# Patient Record
Sex: FEMALE | Race: WHITE | Employment: UNEMPLOYED | URBAN - METROPOLITAN AREA
[De-identification: names, ages, dates, MRNs, and addresses within clinical notes are randomized per-mention and may not be internally consistent; named-entity substitution may affect disease eponyms.]

---

## 2021-11-14 ENCOUNTER — HOSPITAL ENCOUNTER (EMERGENCY)
Facility: HOSPITAL | Age: 41
Discharge: HOME | End: 2021-11-14
Attending: EMERGENCY MEDICINE
Payer: MEDICAID

## 2021-11-14 VITALS
RESPIRATION RATE: 22 BRPM | BODY MASS INDEX: 43.79 KG/M2 | SYSTOLIC BLOOD PRESSURE: 121 MMHG | TEMPERATURE: 98 F | DIASTOLIC BLOOD PRESSURE: 83 MMHG | HEIGHT: 62 IN | HEART RATE: 88 BPM | WEIGHT: 238 LBS | OXYGEN SATURATION: 98 %

## 2021-11-14 DIAGNOSIS — H60.501 ACUTE OTITIS EXTERNA OF RIGHT EAR, UNSPECIFIED TYPE: Primary | ICD-10-CM

## 2021-11-14 PROCEDURE — 99283 EMERGENCY DEPT VISIT LOW MDM: CPT

## 2021-11-14 RX ORDER — NEOMYCIN SULFATE, POLYMYXIN B SULFATE AND BACITRACIN ZINC 3.5; 10000; 4 MG/G; [USP'U]/G; [USP'U]/G
OINTMENT OPHTHALMIC 4 TIMES DAILY
COMMUNITY

## 2021-11-14 RX ORDER — CIPROFLOXACIN AND DEXAMETHASONE 3; 1 MG/ML; MG/ML
4 SUSPENSION/ DROPS AURICULAR (OTIC) 2 TIMES DAILY
Qty: 7.5 ML | Refills: 0 | Status: SHIPPED | OUTPATIENT
Start: 2021-11-14 | End: 2021-11-21

## 2021-11-14 RX ORDER — AMOXICILLIN 875 MG/1
875 TABLET, COATED ORAL 2 TIMES DAILY
COMMUNITY

## 2021-11-14 RX ORDER — TRAMADOL HYDROCHLORIDE 50 MG/1
50 TABLET ORAL EVERY 6 HOURS PRN
Qty: 10 TABLET | Refills: 0 | Status: SHIPPED | OUTPATIENT
Start: 2021-11-14 | End: 2021-11-21

## 2021-11-14 RX ORDER — SERTRALINE HYDROCHLORIDE 100 MG/1
150 TABLET, FILM COATED ORAL DAILY
COMMUNITY

## 2021-11-14 RX ORDER — CETIRIZINE HYDROCHLORIDE 10 MG/1
10 TABLET ORAL DAILY
COMMUNITY

## 2021-11-14 RX ORDER — VALSARTAN 80 MG/1
80 TABLET ORAL DAILY
COMMUNITY

## 2021-11-14 NOTE — ED PROVIDER NOTES
Patient Seen in: [facility] AND [facility] Emergency Department      History   Patient presents with:  Ear Problem Pain    Stated Complaint: ear pain    Subjective:   HPI    68-year-old female with recent diagnosis of otitis media, currently on amoxicillin and n 11/14/21 0629 88   Resp 11/14/21 0629 22   Temp 11/14/21 0629 98.2 °F (36.8 °C)   Temp src --    SpO2 11/14/21 0659 98 %   O2 Device 11/14/21 0659 None (Room air)       Current:/83   Pulse 88   Temp 98.2 °F (36.8 °C)   Resp 22   Ht 157.5 cm (5' 2\") Factors: The patient already has does not have a problem list on file. to contribute to the complexity of his ED evaluation.     - pt  comfortable with d/c at this time, will d/c pt home now with Rx for ciprodex, pt to f/u with Asher Sarah in 2 days or r

## 2021-11-14 NOTE — ED INITIAL ASSESSMENT (HPI)
States that she has Dx with an ea infection earlier this week and placed on antibiotics, Now states that she has increase pain and drainage from the Rt ear.

## 2022-12-29 ENCOUNTER — HOSPITAL ENCOUNTER (EMERGENCY)
Facility: HOSPITAL | Age: 42
Discharge: HOME | End: 2022-12-30
Attending: EMERGENCY MEDICINE
Payer: MEDICAID

## 2022-12-29 DIAGNOSIS — S52.121A CLOSED DISPLACED FRACTURE OF HEAD OF RIGHT RADIUS, INITIAL ENCOUNTER: Primary | ICD-10-CM

## 2022-12-29 DIAGNOSIS — S53.104A DISLOCATION OF RIGHT ELBOW, INITIAL ENCOUNTER: ICD-10-CM

## 2022-12-29 PROCEDURE — 99285 EMERGENCY DEPT VISIT HI MDM: CPT

## 2022-12-29 PROCEDURE — 24600 TX CLSD ELBOW DISLC W/O ANES: CPT

## 2022-12-30 ENCOUNTER — APPOINTMENT (OUTPATIENT)
Dept: CT IMAGING | Facility: HOSPITAL | Age: 42
End: 2022-12-30
Attending: EMERGENCY MEDICINE
Payer: MEDICAID

## 2022-12-30 ENCOUNTER — APPOINTMENT (OUTPATIENT)
Dept: GENERAL RADIOLOGY | Facility: HOSPITAL | Age: 42
End: 2022-12-30
Attending: EMERGENCY MEDICINE
Payer: MEDICAID

## 2022-12-30 VITALS
OXYGEN SATURATION: 98 % | RESPIRATION RATE: 19 BRPM | BODY MASS INDEX: 44 KG/M2 | SYSTOLIC BLOOD PRESSURE: 114 MMHG | WEIGHT: 237.88 LBS | HEART RATE: 79 BPM | DIASTOLIC BLOOD PRESSURE: 67 MMHG

## 2022-12-30 PROCEDURE — 96374 THER/PROPH/DIAG INJ IV PUSH: CPT

## 2022-12-30 PROCEDURE — 73200 CT UPPER EXTREMITY W/O DYE: CPT | Performed by: EMERGENCY MEDICINE

## 2022-12-30 PROCEDURE — 73080 X-RAY EXAM OF ELBOW: CPT | Performed by: EMERGENCY MEDICINE

## 2022-12-30 PROCEDURE — 73070 X-RAY EXAM OF ELBOW: CPT | Performed by: EMERGENCY MEDICINE

## 2022-12-30 PROCEDURE — 96375 TX/PRO/DX INJ NEW DRUG ADDON: CPT

## 2022-12-30 PROCEDURE — 96376 TX/PRO/DX INJ SAME DRUG ADON: CPT

## 2022-12-30 RX ORDER — HYDROCODONE BITARTRATE AND ACETAMINOPHEN 5; 325 MG/1; MG/1
1 TABLET ORAL EVERY 6 HOURS PRN
Qty: 10 TABLET | Refills: 0 | Status: SHIPPED | OUTPATIENT
Start: 2022-12-30 | End: 2023-01-06

## 2022-12-30 RX ORDER — KETAMINE HYDROCHLORIDE 50 MG/ML
0.5 INJECTION, SOLUTION, CONCENTRATE INTRAMUSCULAR; INTRAVENOUS ONCE
Status: COMPLETED | OUTPATIENT
Start: 2022-12-30 | End: 2022-12-30

## 2022-12-30 RX ORDER — MORPHINE SULFATE 4 MG/ML
4 INJECTION, SOLUTION INTRAMUSCULAR; INTRAVENOUS ONCE
Status: COMPLETED | OUTPATIENT
Start: 2022-12-30 | End: 2022-12-30

## 2022-12-30 NOTE — CONSULTS
[facility]    PATIENT'S NAME: Zonia Shaw   ATTENDING PHYSICIAN: Darrow Phoenix, MD   CONSULTING PHYSICIAN: Ted Butts MD   PATIENT ACCOUNT#:   [id number]    LOCATION:  [address]  MEDICAL RECORD #:   [id number]       YOB: 1980  ADMISSION DATE:       12/29/2022      CONSULT DATE:  12/30/2022    REPORT OF CONSULTATION    REASON FOR CONSULTATION:  Irreducible right elbow dislocation. HISTORY OF PRESENT ILLNESS:  The patient is a 28-year-old female who fell earlier this evening at home. She landed on her right outstretched upper extremity and felt immediate pain in the elbow. She was brought to the emergency room and diagnosed with a dislocation of her right elbow. A reduction was attempted in the emergency room by applying axial traction and hyperflexion of the elbow with no improvement in alignment. She reports some numbness in the small finger and ring finger. No prior problems with the elbow. PAST MEDICAL HISTORY:  Unknown. MEDICATIONS:  Valsartan, sertraline, cetirizine. ALLERGIES:  No known drug allergies. SOCIAL HISTORY:  The patient lives with her significant other. She is a nonsmoker. REVIEW OF SYSTEMS:  Unremarkable. PHYSICAL EXAMINATION:    GENERAL:  A mildly obese female in mild distress. She is complaining of pain in the right elbow. EXTREMITIES:  Examination of the right elbow reveals a deformity with palpable radius and ulna lateral to the humerus. There is diminished light touch sensation in the ulnar nerve distribution. She has a difficult time abducting and adducting the fingers. Thumb extension, opposition intact. Light touch sensation intact throughout the thumb and index finger. Fingers are well perfused. LABORATORY DATA:  X-rays of the right elbow were reviewed. There is what appears to be a lateral dislocation of the elbow. There may be a fracture of the radial head.   Coronoid process is difficult to visualize on the lateral view. ASSESSMENT AND PLAN:  The patient has a lateral dislocation of the elbow, which is irreducible at first attempt. I recommended attempted closed reduction here in the emergency room under sedation. The patient was sedated with ketamine and propofol and the elbow was fully extended with the axial traction and a manual force placed on the radial head and olecranon process forcing the forearm bones from lateral to medial.  The elbow reduced. There was a palpable clunk as the elbow reduced. The elbow was then brought into flexion and a long-arm splint was applied with the elbow flexed to 90 degrees. A postreduction CT scan was ordered. She may follow up as an outpatient in 1 week. I expect gradual improvement in her neurologic deficit. Dictated By Lupe Hicks.  Valente Ying MD  d: 12/30/2022 72:31:23  t: 12/30/2022 39:44:40  Isis Melara [id number]  P/

## 2022-12-30 NOTE — ED QUICK NOTES
Patient provided with discharge instructions. Verbalized understanding for plan of care at home and follow up. All question and concerns addressed prior to discharge. IV REMOVED, CATHETER INTACT. GAVE PT RX TO TAKE TO PHARMACY.

## 2022-12-30 NOTE — DISCHARGE INSTRUCTIONS
Take Tylenol as needed for pain. If your pain is not relieved with Tylenol  you can take Norco as needed for severe pain. Each tablet of Norco has 325 milligrams acetaminophen (Tylenol). Do not take more than 3900 mg acetaminophen (Tylenol) in 1 day. Do not drink alcohol or drive while taking Norco.  Take an over-the-counter stool softener such as Colace while taking Norco.  Risk of addiction to pain medication increases after 3 days, make sure to use this for shortest duration as possible and only for severe pain. You can also take ibuprofen as needed for pain. Do not lift anything with your right arm. See orthopedic surgery for follow-up as recommended. Return to the ER if you develop worsening symptoms, worsening numbness, weakness, severe pain, or any emergent concerns.

## 2022-12-30 NOTE — CONSULTS
Pt seen and examined. Full consult dictated. Impression:  Right elbow lateral dislocation, possible radial head, coronoid fracture. Closed reduction performed at bedside with propofol/ketamine sedation. Post-reduction CT ordered. F/u as outpt in 1 week.

## 2022-12-30 NOTE — ED INITIAL ASSESSMENT (HPI)
Pt AOx4 C/C right elbow injury, tripped while walking in basement and landed directly onto elbow. Positive deformity. No medications PTA. Denies LOC.

## 2023-01-26 ENCOUNTER — HOSPITAL ENCOUNTER (EMERGENCY)
Facility: HOSPITAL | Age: 43
Discharge: HOME | End: 2023-01-26
Attending: EMERGENCY MEDICINE
Payer: MEDICAID

## 2023-01-26 ENCOUNTER — APPOINTMENT (OUTPATIENT)
Dept: GENERAL RADIOLOGY | Facility: HOSPITAL | Age: 43
End: 2023-01-26
Attending: EMERGENCY MEDICINE
Payer: MEDICAID

## 2023-01-26 VITALS
BODY MASS INDEX: 44 KG/M2 | HEART RATE: 86 BPM | TEMPERATURE: 98 F | WEIGHT: 237.88 LBS | OXYGEN SATURATION: 98 % | SYSTOLIC BLOOD PRESSURE: 118 MMHG | RESPIRATION RATE: 20 BRPM | DIASTOLIC BLOOD PRESSURE: 70 MMHG

## 2023-01-26 DIAGNOSIS — S83.004A DISLOCATION OF RIGHT PATELLA, INITIAL ENCOUNTER: Primary | ICD-10-CM

## 2023-01-26 PROCEDURE — 73560 X-RAY EXAM OF KNEE 1 OR 2: CPT | Performed by: EMERGENCY MEDICINE

## 2023-01-26 PROCEDURE — 99283 EMERGENCY DEPT VISIT LOW MDM: CPT

## 2023-01-26 PROCEDURE — 99284 EMERGENCY DEPT VISIT MOD MDM: CPT

## 2023-01-26 RX ORDER — HYDROCODONE BITARTRATE AND ACETAMINOPHEN 5; 325 MG/1; MG/1
1 TABLET ORAL ONCE
Status: COMPLETED | OUTPATIENT
Start: 2023-01-26 | End: 2023-01-26

## 2023-01-26 RX ORDER — IBUPROFEN 600 MG/1
600 TABLET ORAL ONCE
Status: COMPLETED | OUTPATIENT
Start: 2023-01-26 | End: 2023-01-26

## 2023-01-26 NOTE — ED INITIAL ASSESSMENT (HPI)
Patient arrives through triage via wheelchair due to a fall. Julianne Martinez getting out of the shower, felt the right knee cap pop out and back in. Unable to bear weight due to pain, 8/10. Denies numbness or tingling. Denies hitting head. No LOC. Denies blood thinner use.

## 2024-08-20 ENCOUNTER — HOSPITAL ENCOUNTER (EMERGENCY)
Facility: HOSPITAL | Age: 44
Discharge: HOME | End: 2024-08-20
Attending: EMERGENCY MEDICINE
Payer: MEDICAID

## 2024-08-20 ENCOUNTER — APPOINTMENT (OUTPATIENT)
Dept: CT IMAGING | Facility: HOSPITAL | Age: 44
End: 2024-08-20
Attending: EMERGENCY MEDICINE
Payer: MEDICAID

## 2024-08-20 VITALS
OXYGEN SATURATION: 97 % | HEART RATE: 75 BPM | HEIGHT: 62 IN | BODY MASS INDEX: 44.16 KG/M2 | TEMPERATURE: 97 F | RESPIRATION RATE: 18 BRPM | WEIGHT: 240 LBS | SYSTOLIC BLOOD PRESSURE: 131 MMHG | DIASTOLIC BLOOD PRESSURE: 77 MMHG
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
## 2024-08-20 DIAGNOSIS — S80.01XA CONTUSION OF RIGHT KNEE, INITIAL ENCOUNTER: ICD-10-CM

## 2024-08-20 DIAGNOSIS — S09.90XA INJURY OF HEAD, INITIAL ENCOUNTER: Primary | ICD-10-CM

## 2024-08-20 PROCEDURE — 99284 EMERGENCY DEPT VISIT MOD MDM: CPT

## 2024-08-20 PROCEDURE — 70450 CT HEAD/BRAIN W/O DYE: CPT | Performed by: EMERGENCY MEDICINE

## 2024-08-20 RX ORDER — ACETAMINOPHEN 500 MG
1000 TABLET ORAL ONCE
Status: COMPLETED | OUTPATIENT
Start: 2024-08-20 | End: 2024-08-20

## 2024-08-21 NOTE — ED INITIAL ASSESSMENT (HPI)
Pt fell down 8 steps down stairs and hit head. Pt hit head denies loc. Denies taking blood thinners.

## 2024-08-21 NOTE — ED PROVIDER NOTES
Patient Seen in: [facility] Emergency Department    History     Chief Complaint   Patient presents with    Fall       HPI    44-year-old female presents ER status post fall.  Patient states she missed a step while walking on the stairs and fell and hit her head.  Patient states she feels little dazed and complaining of headache.  Patient denies any neck pain.  Patient states she did hit her right knee but has full range of motion of the knee and is able to ambulate without pain    History reviewed.   Past Medical History:    Essential hypertension    PCOS (polycystic ovarian syndrome)       History reviewed.   Past Surgical History:   Procedure Laterality Date    Appendectomy      Tonsillectomy           Medications :  (Not in a hospital admission)       No family history on file.    Smoking Status:   Social History     Socioeconomic History    Marital status: Single   Tobacco Use    Smoking status: Some Days     Types: Cigarettes    Smokeless tobacco: Never   Vaping Use    Vaping status: Former   Substance and Sexual Activity    Alcohol use: Never    Drug use: Yes     Types: Cannabis       ROS  All pertinent positives for the review of systems are mentioned in the HPI  All other organ systems are reviewed and are negative.    Constitutional and vital signs reviewed.      Social History and Family History elements reviewed from today, pertinent positives to the presenting problem noted.    Physical Exam     ED Triage Vitals   BP 08/20/24 1949 145/73   Pulse 08/20/24 1948 90   Resp 08/20/24 1948 18   Temp 08/20/24 1948 97.2 °F (36.2 °C)   Temp src 08/20/24 1948 Temporal   SpO2 08/20/24 1948 98 %   O2 Device 08/20/24 1948 None (Room air)       All measures to prevent infection transmission during my interaction with the patient were taken. The patient was already wearing a droplet mask on my arrival to the room. Personal protective equipment including droplet mask, eye protection, and gloves were worn  throughout the duration of the exam.  Handwashing was performed prior to and after the exam.  Stethoscope and any equipment used during my examination was cleaned with super sani-cloth germicidal wipes following the exam.     Physical Exam  Vitals and nursing note reviewed.   Constitutional:       Appearance: She is well-developed.   HENT:      Head: Normocephalic.      Right Ear: External ear normal.      Left Ear: External ear normal.      Nose: Nose normal.   Eyes:      Conjunctiva/sclera: Conjunctivae normal.      Pupils: Pupils are equal, round, and reactive to light.   Cardiovascular:      Rate and Rhythm: Normal rate and regular rhythm.      Heart sounds: Normal heart sounds.   Pulmonary:      Effort: Pulmonary effort is normal.      Breath sounds: Normal breath sounds.   Abdominal:      General: Bowel sounds are normal.      Palpations: Abdomen is soft.   Musculoskeletal:         General: Normal range of motion.      Cervical back: Normal range of motion and neck supple.      Comments: Full range of motion of the right knee, no obvious bruising,   Skin:     General: Skin is warm and dry.   Neurological:      General: No focal deficit present.      Mental Status: She is alert and oriented to person, place, and time. Mental status is at baseline.      Cranial Nerves: No cranial nerve deficit.      Sensory: No sensory deficit.      Motor: No weakness.      Coordination: Coordination normal.      Gait: Gait normal.      Deep Tendon Reflexes: Reflexes are normal and symmetric.   Psychiatric:         Behavior: Behavior normal.         Thought Content: Thought content normal.         Judgment: Judgment normal.         ED Course      Labs Reviewed - No data to display      Imaging Results Available and Reviewed while in ED: CT BRAIN OR HEAD (CPT=70450)    Result Date: 8/20/2024  CONCLUSION:   1. No acute intracranial abnormality. 2. No displaced calvarial fracture. 3. Mild subcutaneous edema involving the right  frontal parietal scalp. 4. Lesser incidental findings described above.    Dictated by (CST): Ish Jimenez MD on 8/20/2024 at 9:24 PM     Finalized by (CST): Ish Jimenez MD on 8/20/2024 at 9:29 PM         ED Medications Administered:   Medications   acetaminophen (Tylenol Extra Strength) tab 1,000 mg (1,000 mg Oral Given 8/20/24 2027)         MDM     Vitals:    08/20/24 1948 08/20/24 1949 08/20/24 2030   BP:  145/73 127/73   Pulse: 90  81   Resp: 18  16   Temp: 97.2 °F (36.2 °C)     TempSrc: Temporal     SpO2: 98%  97%   Weight: 108.9 kg     Height: 157.5 cm (5' 2\")       *I personally reviewed and interpreted all ED vitals.  I also personally reviewed all labs and imaging if ordered    Pulse Ox: 98%, Room air, Normal     Monitor Interpretation:   normal sinus rhythm    Differential Diagnosis/ Diagnostic Considerations: Skull fracture, subdural hematoma, subarachnoid bleed,    Medical Record Review: I personally reviewed available prior medical records for any recent pertinent discharge summaries, testing, and procedures and reviewed those reports.    Complicating Factors: The patient already has does not have a problem list on file. to contribute to the complexity of this ED evaluation.    Medical Decision Making  44-year-old female presents ER status post head injury.  Patient with unknown loss of consciousness.  Patient CT brain shows no acute intracranial process.  Patient given strict head injury instructions to return to the ER if symptoms worsen.  Patient's friend bedside made aware of the discharge planning disposition.    Problems Addressed:  Injury of head, initial encounter: acute illness or injury    Amount and/or Complexity of Data Reviewed  Independent Historian:      Details: Patient's friend bedside states that she is unsure if the patient hit her head when she fell.  Radiology: ordered and independent interpretation performed. Decision-making details documented in ED Course.     Details: CT  brain interpreted by myself shows no acute intracranial process.        Condition upon leaving the department: Stable    Disposition and Plan     Clinical Impression:  1. Injury of head, initial encounter    2. Contusion of right knee, initial encounter    3. Fall, initial encounter        Disposition:  Discharge    Follow-up:  [facility] Emergency Department  [address]  [facility] [address]  [phone]  Go to  If symptoms worsen      Medications Prescribed:  Current Discharge Medication List